# Patient Record
(demographics unavailable — no encounter records)

---

## 2024-11-25 NOTE — HISTORY OF PRESENT ILLNESS
[Mother] : mother [Up to date] : Up to date [Normal] : normal [Cycle Length: _____ days] : Cycle Length: [unfilled] days [Days of Bleeding: _____] : Days of bleeding: [unfilled] [Eats regular meals including adequate fruits and vegetables] : eats regular meals including adequate fruits and vegetables [Has friends] : has friends [At least 1 hour of physical activity a day] : at least 1 hour of physical activity a day [Screen time (except homework) less than 2 hours a day] : screen time (except homework) less than 2 hours a day [Has ways to cope with stress] : has ways to cope with stress [Displays self-confidence] : displays self-confidence [Gets depressed, anxious, or irritable/has mood swings] : gets depressed, anxious, or irritable/has mood swings [NO] : No [Eats meals with family] : eats meals with family [Grade: ____] : Grade: [unfilled] [Drinks alcohol] : drinks alcohol [Uses safety belts/safety equipment] : uses safety belts/safety equipment  [Yes] : Patient has had sexual intercourse. [Uses electronic nicotine delivery system] : does not use electronic nicotine delivery system [Uses tobacco] : does not use tobacco [Uses drugs] : does not use drugs  [Has problems with sleep] : does not have problems with sleep [Has thought about hurting self or considered suicide] : has not thought about hurting self or considered suicide [de-identified] : sophomore in college good student  [de-identified] : Has increased epigastric discomfort increased burping. 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 parents deny any recent illnesses or ER visits Has been feeling very anxious lately and states she has also been having intermittent headaches and GERD.

## 2024-11-25 NOTE — RISK ASSESSMENT

## 2024-11-25 NOTE — REVIEW OF SYSTEMS
[Negative] : Genitourinary [Chest Pain] : chest pain [Appetite Changes] : appetite changes [PO Intolerance] : PO intolerance

## 2024-11-25 NOTE — PHYSICAL EXAM
[Alert] : alert [No Acute Distress] : no acute distress [Normocephalic] : normocephalic [EOMI Bilateral] : EOMI bilateral [Clear tympanic membranes with bony landmarks and light reflex present bilaterally] : clear tympanic membranes with bony landmarks and light reflex present bilaterally  [Pink Nasal Mucosa] : pink nasal mucosa [Nonerythematous Oropharynx] : nonerythematous oropharynx [Supple, full passive range of motion] : supple, full passive range of motion [No Palpable Masses] : no palpable masses [Clear to Auscultation Bilaterally] : clear to auscultation bilaterally [Regular Rate and Rhythm] : regular rate and rhythm [Normal S1, S2 audible] : normal S1, S2 audible [+2 Femoral Pulses] : +2 femoral pulses [Soft] : soft [NonTender] : non tender [Non Distended] : non distended [Normoactive Bowel Sounds] : normoactive bowel sounds [No Hepatomegaly] : no hepatomegaly [No Splenomegaly] : no splenomegaly [Wei: ____] : Wei [unfilled] [No Abnormal Lymph Nodes Palpated] : no abnormal lymph nodes palpated [Normal Muscle Tone] : normal muscle tone [No Gait Asymmetry] : no gait asymmetry [No pain or deformities with palpation of bone, muscles, joints] : no pain or deformities with palpation of bone, muscles, joints [Straight] : straight [+2 Patella DTR] : +2 patella DTR [Cranial Nerves Grossly Intact] : cranial nerves grossly intact [No Rash or Lesions] : no rash or lesions [FreeTextEntry8] : 3/6 harsh systolic murmur

## 2024-11-25 NOTE — DISCUSSION/SUMMARY
[] : The components of the vaccine(s) to be administered today are listed in the plan of care. The disease(s) for which the vaccine(s) are intended to prevent and the risks have been discussed with the caretaker.  The risks are also included in the appropriate vaccination information statements which have been provided to the patient's caregiver.  The caregiver has given consent to vaccinate. [FreeTextEntry1] : THis is a adolescent patient here for routine exam .I  have recommended that the patient participates in 60 minutes or more of physical activity a day.   I explained that it is important to limit screen time to less than 2 hrs a day. Patients diet was discussed and advice given on how to maintain good healthy caloric intake . Physical exam is within normal limits . Patient has been experiencing increased level of anxiety.  States that she appears overwhelmed on occasion will feel chest discomfort as well as epigastric discomfort.  She has a past history of GERD and also has a past history of VSD.  Based on the GERD diet was discussed at length and advice given what foods to avoid.  Also stressed avoiding alcohol.  She was started on famotidine 20 mg a day for 4 weeks.  She was also advised to schedule an appointment with gastroenterology for further evaluation.  Regarding her episodes of on and off intermittent chest pain she is followed by pediatric cardiologist has not seen him in 2 years because of her age she was referred to an adult cardiologist and name given an order for mom to set up an appointment.  Regarding the occasional headaches which appear to be migraine-like she was advised to try a course of Excedrin Migraine to see if this helps with the headaches.  She was also asked to keep a headache diary to see what her triggers are in order to try to avoid them with the hope that they will avoid the headaches.  Diet as a cause stress as a cause overuse of screen time also as a cause lack of sleep also as a cause were discussed.  Regarding her anxiety she was referred to a therapist and to seek  in order to give her mechanisms to control better her anxiety which are most likely the cause for her GI upset headache and chest discomfort..  Routine labs were ordered we will discuss results once available.Immunizations were discussed . Received Trumenba #2 Patient to follow up in 1 year for routine exam  [facility]-    PASSED

## 2024-11-25 NOTE — RISK ASSESSMENT

## 2024-11-25 NOTE — HISTORY OF PRESENT ILLNESS
[Mother] : mother [Up to date] : Up to date [Normal] : normal [Cycle Length: _____ days] : Cycle Length: [unfilled] days [Days of Bleeding: _____] : Days of bleeding: [unfilled] [Eats regular meals including adequate fruits and vegetables] : eats regular meals including adequate fruits and vegetables [Has friends] : has friends [At least 1 hour of physical activity a day] : at least 1 hour of physical activity a day [Screen time (except homework) less than 2 hours a day] : screen time (except homework) less than 2 hours a day [Has ways to cope with stress] : has ways to cope with stress [Displays self-confidence] : displays self-confidence [Gets depressed, anxious, or irritable/has mood swings] : gets depressed, anxious, or irritable/has mood swings [NO] : No [Eats meals with family] : eats meals with family [Grade: ____] : Grade: [unfilled] [Drinks alcohol] : drinks alcohol [Uses safety belts/safety equipment] : uses safety belts/safety equipment  [Yes] : Patient has had sexual intercourse. [Uses electronic nicotine delivery system] : does not use electronic nicotine delivery system [Uses tobacco] : does not use tobacco [Uses drugs] : does not use drugs  [Has problems with sleep] : does not have problems with sleep [Has thought about hurting self or considered suicide] : has not thought about hurting self or considered suicide [de-identified] : sophomore in college good student  [de-identified] : Has increased epigastric discomfort increased burping. 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 parents deny any recent illnesses or ER visits Has been feeling very anxious lately and states she has also been having intermittent headaches and GERD.